# Patient Record
Sex: MALE | Race: OTHER | HISPANIC OR LATINO | URBAN - METROPOLITAN AREA
[De-identification: names, ages, dates, MRNs, and addresses within clinical notes are randomized per-mention and may not be internally consistent; named-entity substitution may affect disease eponyms.]

---

## 2017-03-13 ENCOUNTER — EMERGENCY (EMERGENCY)
Facility: HOSPITAL | Age: 27
LOS: 0 days | Discharge: HOME | End: 2017-03-13
Attending: EMERGENCY MEDICINE | Admitting: EMERGENCY MEDICINE
Payer: COMMERCIAL

## 2017-03-13 VITALS
HEART RATE: 78 BPM | OXYGEN SATURATION: 100 % | DIASTOLIC BLOOD PRESSURE: 81 MMHG | TEMPERATURE: 98 F | RESPIRATION RATE: 17 BRPM | SYSTOLIC BLOOD PRESSURE: 145 MMHG

## 2017-03-13 VITALS — HEIGHT: 69 IN | WEIGHT: 179.9 LBS

## 2017-03-13 DIAGNOSIS — L72.9 FOLLICULAR CYST OF THE SKIN AND SUBCUTANEOUS TISSUE, UNSPECIFIED: ICD-10-CM

## 2017-03-13 PROCEDURE — 99284 EMERGENCY DEPT VISIT MOD MDM: CPT

## 2017-03-13 RX ADMIN — Medication 100 MILLIGRAM(S): at 11:54

## 2017-03-13 NOTE — ED STATDOCS - OBJECTIVE STATEMENT
25 y/o male with no PMHx presents to the ED c/o right groin pain with associated swelling x few months. Swelling increases and decreases. Denies fever, abd pain, CP, SOB. Allergic to PCN. Admits to smoking and alcohol use. No PMD.

## 2017-03-13 NOTE — ED STATDOCS - PROGRESS NOTE DETAILS
patient seen and evaluated, nurse Angela chaperoned groin exam, there is a palpable 1x1 area of firmness in the right groin with mild erythema, consistent with inflammed cyst, patient states this is an on and off problem.  Will treat with abx and anti inflammatory medication, recommended follow up with a dermatologist or surgeon to have removed when inflammation decreases -Pamela Garcia PA-C

## 2017-03-13 NOTE — ED STATDOCS - NS ED MD SCRIBE ATTENDING SCRIBE SECTIONS
REVIEW OF SYSTEMS/PROGRESS NOTE/DISPOSITION/RESULTS/VITAL SIGNS( Pullset)/PHYSICAL EXAM/HISTORY OF PRESENT ILLNESS/PAST MEDICAL/SURGICAL/SOCIAL HISTORY

## 2017-03-13 NOTE — ED ADULT NURSE NOTE - CHPI ED SYMPTOMS NEG
no fever/no drainage/no bleeding/no bleeding at site/no chills/no purulent drainage/no redness/no vomiting/no red streaks

## 2017-03-13 NOTE — ED ADULT NURSE NOTE - OBJECTIVE STATEMENT
Pt presents to the ED with complaints of right groin pain/swelling. Pt states he has had a lump to the right groin for "months" that was painful this morning when he woke up. Pt states pain on palpation but has been able to bear weight and has no limitations due to the lump. Pt denies having any fevers or chills.

## 2017-09-15 ENCOUNTER — TRANSCRIPTION ENCOUNTER (OUTPATIENT)
Age: 27
End: 2017-09-15

## 2017-11-13 ENCOUNTER — EMERGENCY (EMERGENCY)
Facility: HOSPITAL | Age: 27
LOS: 0 days | Discharge: HOME | End: 2017-11-13
Attending: EMERGENCY MEDICINE | Admitting: EMERGENCY MEDICINE
Payer: OTHER MISCELLANEOUS

## 2017-11-13 VITALS
HEART RATE: 60 BPM | DIASTOLIC BLOOD PRESSURE: 70 MMHG | SYSTOLIC BLOOD PRESSURE: 130 MMHG | TEMPERATURE: 98 F | OXYGEN SATURATION: 100 %

## 2017-11-13 VITALS — WEIGHT: 195.11 LBS
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
## 2017-11-13 DIAGNOSIS — T22.00XA BURN OF UNSPECIFIED DEGREE OF SHOULDER AND UPPER LIMB, EXCEPT WRIST AND HAND, UNSPECIFIED SITE, INITIAL ENCOUNTER: ICD-10-CM

## 2017-11-13 DIAGNOSIS — T23.272A BURN OF SECOND DEGREE OF LEFT WRIST, INITIAL ENCOUNTER: ICD-10-CM
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
## 2017-11-13 DIAGNOSIS — T79.9XXA UNSPECIFIED EARLY COMPLICATION OF TRAUMA, INITIAL ENCOUNTER: ICD-10-CM

## 2017-11-13 PROCEDURE — 99283 EMERGENCY DEPT VISIT LOW MDM: CPT

## 2017-11-13 RX ORDER — TETANUS TOXOID, REDUCED DIPHTHERIA TOXOID AND ACELLULAR PERTUSSIS VACCINE, ADSORBED 5; 2.5; 8; 8; 2.5 [IU]/.5ML; [IU]/.5ML; UG/.5ML; UG/.5ML; UG/.5ML
0.5 SUSPENSION INTRAMUSCULAR ONCE
Qty: 0 | Refills: 0 | Status: COMPLETED | OUTPATIENT
Start: 2017-11-13 | End: 2017-11-13

## 2017-11-13 RX ORDER — IBUPROFEN 200 MG
600 TABLET ORAL ONCE
Qty: 0 | Refills: 0 | Status: COMPLETED | OUTPATIENT
Start: 2017-11-13 | End: 2017-11-13

## 2017-11-13 RX ADMIN — Medication 600 MILLIGRAM(S): at 16:18

## 2017-11-13 RX ADMIN — TETANUS TOXOID, REDUCED DIPHTHERIA TOXOID AND ACELLULAR PERTUSSIS VACCINE, ADSORBED 0.5 MILLILITER(S): 5; 2.5; 8; 8; 2.5 SUSPENSION INTRAMUSCULAR at 16:17

## 2017-11-13 NOTE — ED ADULT TRIAGE NOTE - CHIEF COMPLAINT QUOTE
pt states he was taking out hot food out of a [employer] press and bruned his right wrist with hot cheese.

## 2017-11-13 NOTE — ED PROVIDER NOTE - OBJECTIVE STATEMENT
28 Yo M with no pmhx presents for evaluation of burn to left wrist. 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 burn occurred at approximately 1:50PM today. 26 Yo M with no pmhx presents for evaluation of burn to left wrist. 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 burn occurred at approximately 1:50PM today. last tetanus immunization unknown.

## 2017-11-13 NOTE — ED PROVIDER NOTE - MEDICAL DECISION MAKING DETAILS
Burn of wrist, 2nd degree. Closed blisters Will apply bacitracin and wrap up area. -Facundo Joy PA-C

## 2017-11-13 NOTE — ED PROVIDER NOTE - PROGRESS NOTE DETAILS
28 yo male presents with right wrist burn at work when hot cheese spilled on wrist. Last tdap unknwon. 2nd degree burn to wrist.  -Facundo Joy PA-C

## 2017-11-13 NOTE — ED PROCEDURE NOTE - GENERAL PROCEDURE DETAILS
Applied bacitracin over the right volar wrist over the burn and covered with gauze and wrapped with kerlix. -Facundo Joy PA-C

## 2017-11-13 NOTE — ED ADULT NURSE NOTE - OBJECTIVE STATEMENT
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 right inner wrist, blistered as per pt. unable to see due to bandage applied by Rufino BLANCO

## 2017-11-13 NOTE — ED ADULT NURSE NOTE - CHIEF COMPLAINT QUOTE
pt states he was taking out hot food out of a [employer] press and bruned his right wrist with hot cheese.

## 2018-03-20 ENCOUNTER — EMERGENCY (EMERGENCY)
Facility: HOSPITAL | Age: 28
LOS: 0 days | Discharge: HOME | End: 2018-03-20
Attending: EMERGENCY MEDICINE | Admitting: EMERGENCY MEDICINE
Payer: COMMERCIAL

## 2018-03-20 VITALS — HEIGHT: 69 IN | WEIGHT: 190.04 LBS

## 2018-03-20 VITALS
HEART RATE: 84 BPM | RESPIRATION RATE: 16 BRPM | OXYGEN SATURATION: 100 % | SYSTOLIC BLOOD PRESSURE: 115 MMHG | TEMPERATURE: 98 F | DIASTOLIC BLOOD PRESSURE: 53 MMHG

## 2018-03-20 DIAGNOSIS — K92.1 MELENA: ICD-10-CM

## 2018-03-20 LAB
ABO RH CONFIRMATION: SIGNIFICANT CHANGE UP
ALBUMIN SERPL ELPH-MCNC: 4.7 G/DL — SIGNIFICANT CHANGE UP (ref 3.3–5)
ALP SERPL-CCNC: 50 U/L — SIGNIFICANT CHANGE UP (ref 40–120)
ALT FLD-CCNC: 26 U/L — SIGNIFICANT CHANGE UP (ref 12–78)
ANION GAP SERPL CALC-SCNC: 6 MMOL/L — SIGNIFICANT CHANGE UP (ref 5–17)
APTT BLD: 30.8 SEC — SIGNIFICANT CHANGE UP (ref 27.5–37.4)
AST SERPL-CCNC: 16 U/L — SIGNIFICANT CHANGE UP (ref 15–37)
BASOPHILS # BLD AUTO: 0.03 K/UL — SIGNIFICANT CHANGE UP (ref 0–0.2)
BASOPHILS NFR BLD AUTO: 0.4 % — SIGNIFICANT CHANGE UP (ref 0–2)
BILIRUB SERPL-MCNC: 0.3 MG/DL — SIGNIFICANT CHANGE UP (ref 0.2–1.2)
BLD GP AB SCN SERPL QL: SIGNIFICANT CHANGE UP
BUN SERPL-MCNC: 20 MG/DL — SIGNIFICANT CHANGE UP (ref 7–23)
CALCIUM SERPL-MCNC: 9.4 MG/DL — SIGNIFICANT CHANGE UP (ref 8.5–10.1)
CHLORIDE SERPL-SCNC: 104 MMOL/L — SIGNIFICANT CHANGE UP (ref 96–108)
CO2 SERPL-SCNC: 30 MMOL/L — SIGNIFICANT CHANGE UP (ref 22–31)
CREAT SERPL-MCNC: 1.18 MG/DL — SIGNIFICANT CHANGE UP (ref 0.5–1.3)
EOSINOPHIL # BLD AUTO: 0.11 K/UL — SIGNIFICANT CHANGE UP (ref 0–0.5)
EOSINOPHIL NFR BLD AUTO: 1.3 % — SIGNIFICANT CHANGE UP (ref 0–6)
GLUCOSE SERPL-MCNC: 88 MG/DL — SIGNIFICANT CHANGE UP (ref 70–99)
HCT VFR BLD CALC: 45.4 % — SIGNIFICANT CHANGE UP (ref 39–50)
HGB BLD-MCNC: 15 G/DL — SIGNIFICANT CHANGE UP (ref 13–17)
IMM GRANULOCYTES NFR BLD AUTO: 0.5 % — SIGNIFICANT CHANGE UP (ref 0–1.5)
INR BLD: 1.04 RATIO — SIGNIFICANT CHANGE UP (ref 0.88–1.16)
LIDOCAIN IGE QN: 161 U/L — SIGNIFICANT CHANGE UP (ref 73–393)
LYMPHOCYTES # BLD AUTO: 2.45 K/UL — SIGNIFICANT CHANGE UP (ref 1–3.3)
LYMPHOCYTES # BLD AUTO: 29.3 % — SIGNIFICANT CHANGE UP (ref 13–44)
MCHC RBC-ENTMCNC: 29.5 PG — SIGNIFICANT CHANGE UP (ref 27–34)
MCHC RBC-ENTMCNC: 33 GM/DL — SIGNIFICANT CHANGE UP (ref 32–36)
MCV RBC AUTO: 89.4 FL — SIGNIFICANT CHANGE UP (ref 80–100)
MONOCYTES # BLD AUTO: 0.48 K/UL — SIGNIFICANT CHANGE UP (ref 0–0.9)
MONOCYTES NFR BLD AUTO: 5.7 % — SIGNIFICANT CHANGE UP (ref 2–14)
NEUTROPHILS # BLD AUTO: 5.24 K/UL — SIGNIFICANT CHANGE UP (ref 1.8–7.4)
NEUTROPHILS NFR BLD AUTO: 62.8 % — SIGNIFICANT CHANGE UP (ref 43–77)
NRBC # BLD: 0 /100 WBCS — SIGNIFICANT CHANGE UP (ref 0–0)
PLATELET # BLD AUTO: 272 K/UL — SIGNIFICANT CHANGE UP (ref 150–400)
POTASSIUM SERPL-MCNC: 4.3 MMOL/L — SIGNIFICANT CHANGE UP (ref 3.5–5.3)
POTASSIUM SERPL-SCNC: 4.3 MMOL/L — SIGNIFICANT CHANGE UP (ref 3.5–5.3)
PROT SERPL-MCNC: 7.8 GM/DL — SIGNIFICANT CHANGE UP (ref 6–8.3)
PROTHROM AB SERPL-ACNC: 11.2 SEC — SIGNIFICANT CHANGE UP (ref 9.8–12.7)
RBC # BLD: 5.08 M/UL — SIGNIFICANT CHANGE UP (ref 4.2–5.8)
RBC # FLD: 13.2 % — SIGNIFICANT CHANGE UP (ref 10.3–14.5)
SODIUM SERPL-SCNC: 140 MMOL/L — SIGNIFICANT CHANGE UP (ref 135–145)
TYPE + AB SCN PNL BLD: SIGNIFICANT CHANGE UP
WBC # BLD: 8.35 K/UL — SIGNIFICANT CHANGE UP (ref 3.8–10.5)
WBC # FLD AUTO: 8.35 K/UL — SIGNIFICANT CHANGE UP (ref 3.8–10.5)

## 2018-03-20 PROCEDURE — 74177 CT ABD & PELVIS W/CONTRAST: CPT | Mod: 26

## 2018-03-20 PROCEDURE — 99284 EMERGENCY DEPT VISIT MOD MDM: CPT

## 2018-03-20 RX ORDER — SODIUM CHLORIDE 9 MG/ML
3 INJECTION INTRAMUSCULAR; INTRAVENOUS; SUBCUTANEOUS ONCE
Qty: 0 | Refills: 0 | Status: COMPLETED | OUTPATIENT
Start: 2018-03-20 | End: 2018-03-20

## 2018-03-20 RX ADMIN — SODIUM CHLORIDE 3 MILLILITER(S): 9 INJECTION INTRAMUSCULAR; INTRAVENOUS; SUBCUTANEOUS at 16:36

## 2018-03-20 NOTE — ED STATDOCS - PROGRESS NOTE DETAILS
FRANCIS Roque:   Patient has been seen, evaluated and orders have been written by the attending in intake. Patient is stable.  I will follow up the results of orders written and I will continue to evaluate/observe the patient. WBC 8,350, Hgb 15, Hct 45.4, Platelets 272  Na 140, K+ 4.3, glucose 88, BUN 20, Cr 1.18  Lipase 161.  CT pending.  Ext Rectal: No fissures.  No external hemorrhoids. Examined with Dr. Trujillo.  Cassidy Roque PA-C WBC 8,350, Hgb 15, Hct 45.4, Platelets 272  Na 140, K+ 4.3, glucose 88, BUN 20, Cr 1.18  Lipase 161.  CT pending.  Ext Rectal: No fissures.  No external hemorrhoids.  Examined with Dr. Trujillo.  Cassidy Roque PA-C CT scan:  Unremarkable CT of the abdomen and pelvis.  No evidence of Colitis. No abnormal Hgb/Hct.  Will refer pt. to GI.  Cassidy Roque PA-C

## 2018-03-20 NOTE — ED ADULT NURSE NOTE - OBJECTIVE STATEMENT
28 y/o M presents to the ED c/o BIBPR that started approximately over a month ago intermittently. Pt states that between yesterday and today, increased in bright red blood seen in stool and when wiping. Pt denies abdominal pain or pain upon having a bm. Pt denies nausea, vomiting.

## 2018-03-20 NOTE — ED STATDOCS - NS_ ATTENDINGSCRIBEDETAILS _ED_A_ED_FT
I, Dinh Crain MD,  performed the initial face to face bedside interview with this patient regarding history of present illness, review of symptoms and relevant past medical, social and family history.  I completed an independent physical examination.    The history, relevant review of systems, past medical and surgical history, medical decision making, and physical examination was documented by the scribe in my presence and I attest to the accuracy of the documentation.

## 2018-03-20 NOTE — ED STATDOCS - ATTENDING CONTRIBUTION TO CARE
I, Dinh Crain MD,  performed the initial face to face bedside interview with this patient regarding history of present illness, review of symptoms and relevant past medical, social and family history.  I completed an independent physical examination.  I was the initial provider who evaluated this patient. I have signed out the follow up of any pending tests (i.e. labs, radiological studies) to the ACP.  I have communicated the patient’s plan of care and disposition with the ACP

## 2018-03-20 NOTE — ED STATDOCS - OBJECTIVE STATEMENT
28 y/o M   c/o intermittent streaks of blood, intermittent mixed with stools for one months and now progressively worsening, today is complete red. Denies pain. PT states he has had hemorrhoids before with discomfort but at this time no discomfort.  Denies cramping, abd pain, vomiting, fever, no hx of surgery to abd. Rj abnormal diet. PMD: No current. No current medication. Pt states he had two bowel movements with red "globs" of stool. No foreign travel. Denies hx of colitis. No bleeding to site.

## 2018-03-20 NOTE — ED STATDOCS - GASTROINTESTINAL, MLM
no visualized Hemorid, no rectal fissures. abdomen soft, non-tender, and non-distended. Bowel sounds present.

## 2018-06-16 ENCOUNTER — TRANSCRIPTION ENCOUNTER (OUTPATIENT)
Age: 28
End: 2018-06-16

## 2018-06-17 ENCOUNTER — EMERGENCY (EMERGENCY)
Facility: HOSPITAL | Age: 28
LOS: 1 days | Discharge: HOME | End: 2018-06-17
Attending: EMERGENCY MEDICINE
Payer: COMMERCIAL

## 2018-06-17 ENCOUNTER — TRANSCRIPTION ENCOUNTER (OUTPATIENT)
Age: 28
End: 2018-06-17

## 2018-06-17 VITALS
DIASTOLIC BLOOD PRESSURE: 70 MMHG | SYSTOLIC BLOOD PRESSURE: 120 MMHG | TEMPERATURE: 98 F | HEART RATE: 74 BPM | OXYGEN SATURATION: 99 % | RESPIRATION RATE: 19 BRPM

## 2018-06-17 VITALS — WEIGHT: 190.04 LBS | HEIGHT: 69 IN

## 2018-06-17 LAB
ALBUMIN SERPL ELPH-MCNC: 4.5 G/DL — SIGNIFICANT CHANGE UP (ref 3.3–5.2)
ALP SERPL-CCNC: 51 U/L — SIGNIFICANT CHANGE UP (ref 40–120)
ALT FLD-CCNC: 25 U/L — SIGNIFICANT CHANGE UP
ANION GAP SERPL CALC-SCNC: 12 MMOL/L — SIGNIFICANT CHANGE UP (ref 5–17)
AST SERPL-CCNC: 21 U/L — SIGNIFICANT CHANGE UP
BASOPHILS # BLD AUTO: 0 K/UL — SIGNIFICANT CHANGE UP (ref 0–0.2)
BASOPHILS NFR BLD AUTO: 0.1 % — SIGNIFICANT CHANGE UP (ref 0–2)
BILIRUB SERPL-MCNC: 0.5 MG/DL — SIGNIFICANT CHANGE UP (ref 0.4–2)
BUN SERPL-MCNC: 16 MG/DL — SIGNIFICANT CHANGE UP (ref 8–20)
CALCIUM SERPL-MCNC: 9.3 MG/DL — SIGNIFICANT CHANGE UP (ref 8.6–10.2)
CHLORIDE SERPL-SCNC: 101 MMOL/L — SIGNIFICANT CHANGE UP (ref 98–107)
CO2 SERPL-SCNC: 26 MMOL/L — SIGNIFICANT CHANGE UP (ref 22–29)
CREAT SERPL-MCNC: 0.93 MG/DL — SIGNIFICANT CHANGE UP (ref 0.5–1.3)
EOSINOPHIL # BLD AUTO: 0.1 K/UL — SIGNIFICANT CHANGE UP (ref 0–0.5)
EOSINOPHIL NFR BLD AUTO: 0.9 % — SIGNIFICANT CHANGE UP (ref 0–5)
GLUCOSE SERPL-MCNC: 104 MG/DL — SIGNIFICANT CHANGE UP (ref 70–115)
HCT VFR BLD CALC: 44.4 % — SIGNIFICANT CHANGE UP (ref 42–52)
HGB BLD-MCNC: 14.7 G/DL — SIGNIFICANT CHANGE UP (ref 14–18)
LACTATE BLDV-MCNC: 1.2 MMOL/L — SIGNIFICANT CHANGE UP (ref 0.5–2)
LYMPHOCYTES # BLD AUTO: 1.2 K/UL — SIGNIFICANT CHANGE UP (ref 1–4.8)
LYMPHOCYTES # BLD AUTO: 14.8 % — LOW (ref 20–55)
MCHC RBC-ENTMCNC: 29.6 PG — SIGNIFICANT CHANGE UP (ref 27–31)
MCHC RBC-ENTMCNC: 33.1 G/DL — SIGNIFICANT CHANGE UP (ref 32–36)
MCV RBC AUTO: 89.3 FL — SIGNIFICANT CHANGE UP (ref 80–94)
MONOCYTES # BLD AUTO: 0.6 K/UL — SIGNIFICANT CHANGE UP (ref 0–0.8)
MONOCYTES NFR BLD AUTO: 7.8 % — SIGNIFICANT CHANGE UP (ref 3–10)
NEUTROPHILS # BLD AUTO: 6.1 K/UL — SIGNIFICANT CHANGE UP (ref 1.8–8)
NEUTROPHILS NFR BLD AUTO: 76 % — HIGH (ref 37–73)
PLATELET # BLD AUTO: 241 K/UL — SIGNIFICANT CHANGE UP (ref 150–400)
POTASSIUM SERPL-MCNC: 4 MMOL/L — SIGNIFICANT CHANGE UP (ref 3.5–5.3)
POTASSIUM SERPL-SCNC: 4 MMOL/L — SIGNIFICANT CHANGE UP (ref 3.5–5.3)
PROT SERPL-MCNC: 6.9 G/DL — SIGNIFICANT CHANGE UP (ref 6.6–8.7)
RBC # BLD: 4.97 M/UL — SIGNIFICANT CHANGE UP (ref 4.6–6.2)
RBC # FLD: 13.4 % — SIGNIFICANT CHANGE UP (ref 11–15.6)
SODIUM SERPL-SCNC: 139 MMOL/L — SIGNIFICANT CHANGE UP (ref 135–145)
WBC # BLD: 8 K/UL — SIGNIFICANT CHANGE UP (ref 4.8–10.8)
WBC # FLD AUTO: 8 K/UL — SIGNIFICANT CHANGE UP (ref 4.8–10.8)

## 2018-06-17 PROCEDURE — 87040 BLOOD CULTURE FOR BACTERIA: CPT

## 2018-06-17 PROCEDURE — 87070 CULTURE OTHR SPECIMN AEROBIC: CPT

## 2018-06-17 PROCEDURE — 99284 EMERGENCY DEPT VISIT MOD MDM: CPT | Mod: 25

## 2018-06-17 PROCEDURE — 80053 COMPREHEN METABOLIC PANEL: CPT

## 2018-06-17 PROCEDURE — 36415 COLL VENOUS BLD VENIPUNCTURE: CPT

## 2018-06-17 PROCEDURE — 73110 X-RAY EXAM OF WRIST: CPT | Mod: 26,LT

## 2018-06-17 PROCEDURE — 83605 ASSAY OF LACTIC ACID: CPT

## 2018-06-17 PROCEDURE — 96374 THER/PROPH/DIAG INJ IV PUSH: CPT

## 2018-06-17 PROCEDURE — 99284 EMERGENCY DEPT VISIT MOD MDM: CPT

## 2018-06-17 PROCEDURE — 85027 COMPLETE CBC AUTOMATED: CPT

## 2018-06-17 PROCEDURE — 73110 X-RAY EXAM OF WRIST: CPT

## 2018-06-17 RX ORDER — IBUPROFEN 200 MG
1 TABLET ORAL
Qty: 15 | Refills: 0 | OUTPATIENT
Start: 2018-06-17 | End: 2018-06-21

## 2018-06-17 RX ADMIN — Medication 100 MILLIGRAM(S): at 15:52

## 2018-06-17 NOTE — ED STATDOCS - OBJECTIVE STATEMENT
27 yr old M presented to ED with left wrist cat bite. Pt explained that he was  his dog and his brother cat from fighting when the cat bit him on his left wrist. Pt says that incident occurred yesterday and he noticed swelling and redness to his wrist. Pt admits to pain with wrist flexion and extension. Pt denies any numbness or weakness to his wrist. Pt denies nay significant past medical or surgical illness.

## 2018-06-17 NOTE — ED STATDOCS - PHYSICAL EXAMINATION
Animal bite: Left wrist +cat bite and multiple puncture wounds noted. Bite to both 4th and 5th digit . No injury to finger nail.   Normal ROM present. Normal sensation .

## 2018-06-17 NOTE — ED ADULT NURSE NOTE - OBJECTIVE STATEMENT
pt c/o bite to left arm. pt was bitten by brother's exotic b[place] 3rd generation domestic cat. states he was breaking up fight between cat and his dog. pt state his last tetnus was 2 years ago. denies medical history. left arm swollen and reddened bleeding controlled

## 2018-06-17 NOTE — ED STATDOCS - CARE PLAN
Principal Discharge DX:	Cat bite, initial encounter  Assessment and plan of treatment:	Continue with Medication and F/U as discussed

## 2018-06-17 NOTE — ED STATDOCS - PROGRESS NOTE DETAILS
Pt seen by Dr. Mae. and wound was opened. Pt will Continue wit Augmentin twice daily x 10 days. Pt will also F/U with Dr. Mae on Tuesday for Packing removal.

## 2018-06-17 NOTE — ED STATDOCS - ATTENDING CONTRIBUTION TO CARE
After interviewing the patient, I agree with the HPI as discussed . My personal exam reveals findings consistent with those discussed. Available diagnostic studies were reviewed. I confirm the diagnosis as discussed with the ACP. The care plan articulated is consistent with our discussion of the patient's particulars. In brief, Hx [cat bite ],Exam [Redness and swelling And the multiple puncture wounds to  the Left wrist and the fourth And fifth digits ], Plan[ Patient was seen by Plastic  surgery And Was opened And Drained And patient will continue Augmentin and Follow up in the office]

## 2018-06-19 LAB
CULTURE RESULTS: SIGNIFICANT CHANGE UP
SPECIMEN SOURCE: SIGNIFICANT CHANGE UP

## 2018-06-22 LAB
CULTURE RESULTS: SIGNIFICANT CHANGE UP
CULTURE RESULTS: SIGNIFICANT CHANGE UP
SPECIMEN SOURCE: SIGNIFICANT CHANGE UP
SPECIMEN SOURCE: SIGNIFICANT CHANGE UP

## 2018-08-11 ENCOUNTER — TRANSCRIPTION ENCOUNTER (OUTPATIENT)
Age: 28
End: 2018-08-11

## 2018-08-28 ENCOUNTER — TRANSCRIPTION ENCOUNTER (OUTPATIENT)
Age: 28
End: 2018-08-28

## 2019-11-22 ENCOUNTER — TRANSCRIPTION ENCOUNTER (OUTPATIENT)
Age: 29
End: 2019-11-22

## 2019-12-04 ENCOUNTER — APPOINTMENT (OUTPATIENT)
Dept: ORTHOPEDIC SURGERY | Facility: CLINIC | Age: 29
End: 2019-12-04
Payer: COMMERCIAL

## 2019-12-04 VITALS
BODY MASS INDEX: 28.88 KG/M2 | DIASTOLIC BLOOD PRESSURE: 68 MMHG | TEMPERATURE: 98.4 F | SYSTOLIC BLOOD PRESSURE: 116 MMHG | HEIGHT: 69 IN | WEIGHT: 195 LBS | HEART RATE: 68 BPM

## 2019-12-04 DIAGNOSIS — Z72.89 OTHER PROBLEMS RELATED TO LIFESTYLE: ICD-10-CM

## 2019-12-04 DIAGNOSIS — M65.4 RADIAL STYLOID TENOSYNOVITIS [DE QUERVAIN]: ICD-10-CM

## 2019-12-04 DIAGNOSIS — Z78.9 OTHER SPECIFIED HEALTH STATUS: ICD-10-CM

## 2019-12-04 PROCEDURE — 73100 X-RAY EXAM OF WRIST: CPT | Mod: RT

## 2019-12-04 PROCEDURE — 99213 OFFICE O/P EST LOW 20 MIN: CPT

## 2019-12-09 PROBLEM — M65.4 TENOSYNOVITIS, DE QUERVAIN: Status: ACTIVE | Noted: 2019-12-09

## 2019-12-09 NOTE — HISTORY OF PRESENT ILLNESS
[6] : the ailment interference is 6/10 [7] : the ailment interference is 7/10 [(Does not interfere) 0] : the ailment interference is 0/10 (does not interfere) [de-identified] : The patient comes in today for his right wrist.  He had been seen in the past for tendinitis and he was doing well.  Most recently, he overdid it on Thanksgiving and developed pain some pain on the radial border of his wrist.  The patient states the onset/injury occurred on 11/29/2019.  This injury is not work related or due to an automobile accident.  [] : No

## 2019-12-09 NOTE — PHYSICAL EXAM
[Normal] : Gait: normal [de-identified] : Left Wrist: Range of Motion in Degrees:\par 	                                                Claimant:	                Normal:	\par Dorsiflexion: (Active)               	90-degrees	90-degrees	\par Dorsiflexion: (Passive)	                90-degrees	90-degrees	\par Palmar flexion: (Active)              	90-degrees	90-degrees	\par Palmar flexion: (Passive)              	90-degrees	90-degrees	\par Radial & ulnar deviation(Active)	30-degrees	30-degrees	\par Radial & ulnar deviation(Passive)	30-degrees	30-degrees	\par Pronation/Supination:(Active)    	0-180 degrees	0-180 degrees	\par Pronation/Supination:(Passive)          	0-180 degrees	0-180 degrees	\par \par No instability.  No volar, radial or ulnar tenderness.  No dorsal, radial or ulnar tenderness.  Negative Tinel’s.  Negative Phalen’s.   No tenderness at the TFCC.  No tenderness with ulnar deviation.  No tenderness of the first dorsal compartment.  Negative Finkelstein’s.  No tenderness at the level of the basal joint.  Negative grind.  No muscular atrophy.  No tenderness with flexion and extension of the wrist.  No motor or sensory deficits.  2+ radial and ulnar pulses.  Skin is intact.  No rashes, scars or lesions.  \par \par Right Wrist: Range of Motion in Degrees:\par 	                                                Claimant:	                Normal:	\par Dorsiflexion: (Active)               	90-degrees	90-degrees	\par Dorsiflexion: (Passive)	                90-degrees	90-degrees	\par Palmar flexion: (Active)              	90-degrees	90-degrees	\par Palmar flexion: (Passive)              	90-degrees	90-degrees	\par Radial & ulnar deviation(Active)	30-degrees	30-degrees	\par Radial & ulnar deviation(Passive)	30-degrees	30-degrees	\par Pronation/Supination:(Active)    	0-180 degrees	0-180 degrees	\par Pronation/Supination:(Passive)          	0-180 degrees	0-180 degrees	\par \par No instability.  Tenderness over the first dorsal compartment.  No volar, radial or ulnar tenderness.  No dorsal, radial or ulnar tenderness.  Negative Tinel’s.  Negative Phalen’s.   No tenderness at the TFCC.  No tenderness with ulnar deviation  No tenderness of the first dorsal compartment.  Positive Finkelstein’s test.  No tenderness at the level of the basal joint.  Negative grind.  No muscular atrophy.  No motor or sensory deficits.  2+ radial and ulnar pulses.  Skin is intact.  No rashes, scars or lesions. \par   [de-identified] : Appearance:  Well developed, well-nourished male in no acute distress.\par   [de-identified] : Radiographs, two views of the right wrist, show no significant osseous abnormalities.\par

## 2019-12-09 NOTE — ADDENDUM
[FreeTextEntry1] : This note was written by Jordin Arzola on 12/09/2019, acting as a scribe for Carl Richardson III, MD

## 2019-12-09 NOTE — DISCUSSION/SUMMARY
[de-identified] : At this time, due to de Quervain tenosynovitis of the right wrist, I recommended a wrist splint, ice, anti-inflammatory and reassessment in 3-4 weeks.\par

## 2020-02-05 ENCOUNTER — APPOINTMENT (OUTPATIENT)
Dept: UROLOGY | Facility: CLINIC | Age: 30
End: 2020-02-05

## 2020-03-14 ENCOUNTER — TRANSCRIPTION ENCOUNTER (OUTPATIENT)
Age: 30
End: 2020-03-14

## 2020-04-25 ENCOUNTER — MESSAGE (OUTPATIENT)
Age: 30
End: 2020-04-25

## 2020-05-01 LAB
SARS-COV-2 IGG SERPL IA-ACNC: 0 INDEX
SARS-COV-2 IGG SERPL QL IA: NEGATIVE

## 2020-07-29 ENCOUNTER — APPOINTMENT (OUTPATIENT)
Dept: UROLOGY | Facility: CLINIC | Age: 30
End: 2020-07-29

## 2020-08-12 ENCOUNTER — APPOINTMENT (OUTPATIENT)
Dept: UROLOGY | Facility: CLINIC | Age: 30
End: 2020-08-12
Payer: COMMERCIAL

## 2020-08-12 ENCOUNTER — APPOINTMENT (OUTPATIENT)
Dept: UROLOGY | Facility: CLINIC | Age: 30
End: 2020-08-12

## 2020-08-12 VITALS
OXYGEN SATURATION: 99 % | HEIGHT: 69 IN | TEMPERATURE: 98 F | SYSTOLIC BLOOD PRESSURE: 127 MMHG | HEART RATE: 61 BPM | BODY MASS INDEX: 28.88 KG/M2 | WEIGHT: 195 LBS | DIASTOLIC BLOOD PRESSURE: 76 MMHG

## 2020-08-12 PROCEDURE — 99203 OFFICE O/P NEW LOW 30 MIN: CPT

## 2020-08-12 NOTE — HISTORY OF PRESENT ILLNESS
[FreeTextEntry1] : 28 yo male presents for scrotal mass. \par Has noticed lump on left side of scrotum for last 1 year and grown in size. \par No pain or discomfort but causes itching in hot weather. \par No history of trauma or STD. \par Denies any difficulty with urination. \par Denies dysuria, hematuria, lower abdominal or flank pain, fever, chills or rigors.

## 2020-08-12 NOTE — ASSESSMENT
[FreeTextEntry1] : Scrotal sebaceous cyst:\par Discussed benign lesion. Discussed observation, trial of Antibiotics and surgical excision. \par Will do Bactrim DS x 5 days. \par If still persistent will get Scrotal Ultrasound and schedule excision under local anesthesia.

## 2020-08-12 NOTE — PHYSICAL EXAM
[General Appearance - Well Developed] : well developed [Normal Appearance] : normal appearance [] : no respiratory distress [General Appearance - In No Acute Distress] : no acute distress [Abdomen Tenderness] : non-tender [Abdomen Mass (___ Cm)] : no abdominal mass palpated [Abdomen Soft] : soft [Costovertebral Angle Tenderness] : no ~M costovertebral angle tenderness [Penis Abnormality] : normal circumcised penis [Urethral Meatus] : meatus normal [Testes Tenderness] : no tenderness of the testes [Epididymis] : the epididymides were normal [Testes Mass (___cm)] : there were no testicular masses [Normal Station and Gait] : the gait and station were normal for the patient's age [Skin Color & Pigmentation] : normal skin color and pigmentation [FreeTextEntry1] : left 1cm mid scrotal sebaceous cyst  [No Focal Deficits] : no focal deficits [Oriented To Time, Place, And Person] : oriented to person, place, and time [No Palpable Adenopathy] : no palpable adenopathy

## 2020-08-19 ENCOUNTER — APPOINTMENT (OUTPATIENT)
Dept: UROLOGY | Facility: CLINIC | Age: 30
End: 2020-08-19
Payer: COMMERCIAL

## 2020-08-19 VITALS — TEMPERATURE: 98.4 F

## 2020-08-19 PROCEDURE — 99213 OFFICE O/P EST LOW 20 MIN: CPT

## 2020-08-26 ENCOUNTER — OUTPATIENT (OUTPATIENT)
Dept: OUTPATIENT SERVICES | Facility: HOSPITAL | Age: 30
LOS: 1 days | End: 2020-08-26
Payer: COMMERCIAL

## 2020-08-26 DIAGNOSIS — L72.3 SEBACEOUS CYST: ICD-10-CM

## 2020-08-26 PROCEDURE — 76870 US EXAM SCROTUM: CPT | Mod: 26

## 2020-08-26 PROCEDURE — 76870 US EXAM SCROTUM: CPT

## 2020-08-27 DIAGNOSIS — L72.3 SEBACEOUS CYST: ICD-10-CM

## 2020-08-30 NOTE — HISTORY OF PRESENT ILLNESS
[FreeTextEntry1] : 30 yo male presents for follow up. \par Did not do Antibiotics, concerned about side effects. \par Will like to move forward with excision. \par \par \par Initially seen for scrotal mass. \par Had noticed lump on left side of scrotum for last 1 year and grown in size. \par No pain or discomfort but causes itching in hot weather. \par No history of trauma or STD. \par Denied any difficulty with urination. \par Denied dysuria, hematuria, lower abdominal or flank pain, fever, chills or rigors.

## 2020-08-30 NOTE — PHYSICAL EXAM
[Normal Appearance] : normal appearance [General Appearance - In No Acute Distress] : no acute distress [FreeTextEntry1] : normal peripheral circulation  [] : no respiratory distress [Oriented To Time, Place, And Person] : oriented to person, place, and time [Normal Station and Gait] : the gait and station were normal for the patient's age

## 2020-09-02 ENCOUNTER — APPOINTMENT (OUTPATIENT)
Dept: UROLOGY | Facility: CLINIC | Age: 30
End: 2020-09-02
Payer: COMMERCIAL

## 2020-09-02 VITALS
HEIGHT: 69 IN | SYSTOLIC BLOOD PRESSURE: 121 MMHG | TEMPERATURE: 97.7 F | HEART RATE: 77 BPM | BODY MASS INDEX: 28.88 KG/M2 | OXYGEN SATURATION: 100 % | DIASTOLIC BLOOD PRESSURE: 78 MMHG | WEIGHT: 195 LBS

## 2020-09-02 PROCEDURE — 11422 EXC H-F-NK-SP B9+MARG 1.1-2: CPT

## 2020-09-08 LAB — CORE LAB BIOPSY: NORMAL

## 2020-09-09 ENCOUNTER — EMERGENCY (EMERGENCY)
Facility: HOSPITAL | Age: 30
LOS: 0 days | Discharge: HOME | End: 2020-09-09
Attending: STUDENT IN AN ORGANIZED HEALTH CARE EDUCATION/TRAINING PROGRAM
Payer: COMMERCIAL

## 2020-09-09 VITALS
DIASTOLIC BLOOD PRESSURE: 87 MMHG | SYSTOLIC BLOOD PRESSURE: 151 MMHG | OXYGEN SATURATION: 98 % | TEMPERATURE: 99 F | HEART RATE: 90 BPM | RESPIRATION RATE: 18 BRPM

## 2020-09-09 VITALS
OXYGEN SATURATION: 99 % | SYSTOLIC BLOOD PRESSURE: 128 MMHG | RESPIRATION RATE: 15 BRPM | DIASTOLIC BLOOD PRESSURE: 69 MMHG | HEART RATE: 69 BPM | TEMPERATURE: 98 F

## 2020-09-09 DIAGNOSIS — L72.9 FOLLICULAR CYST OF THE SKIN AND SUBCUTANEOUS TISSUE, UNSPECIFIED: ICD-10-CM

## 2020-09-09 DIAGNOSIS — Z79.2 LONG TERM (CURRENT) USE OF ANTIBIOTICS: ICD-10-CM

## 2020-09-09 DIAGNOSIS — Z79.1 LONG TERM (CURRENT) USE OF NON-STEROIDAL ANTI-INFLAMMATORIES (NSAID): ICD-10-CM

## 2020-09-09 DIAGNOSIS — Z98.890 OTHER SPECIFIED POSTPROCEDURAL STATES: ICD-10-CM

## 2020-09-09 DIAGNOSIS — N99.820 POSTPROCEDURAL HEMORRHAGE OF A GENITOURINARY SYSTEM ORGAN OR STRUCTURE FOLLOWING A GENITOURINARY SYSTEM PROCEDURE: ICD-10-CM

## 2020-09-09 DIAGNOSIS — T81.41XA INFECTION FOLLOWING A PROCEDURE, SUPERFICIAL INCISIONAL SURGICAL SITE, INITIAL ENCOUNTER: ICD-10-CM
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
## 2020-09-09 DIAGNOSIS — Z88.0 ALLERGY STATUS TO PENICILLIN: ICD-10-CM
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
## 2020-09-09 PROCEDURE — 99284 EMERGENCY DEPT VISIT MOD MDM: CPT

## 2020-09-09 PROCEDURE — 99284 EMERGENCY DEPT VISIT MOD MDM: CPT | Mod: 25

## 2020-09-09 RX ORDER — AZTREONAM 2 G
1 VIAL (EA) INJECTION
Qty: 6 | Refills: 0
Start: 2020-09-09 | End: 2020-09-11

## 2020-09-09 NOTE — CONSULT NOTE ADULT - ASSESSMENT
28 yo male with hx of left scrotal cyst s/p excision of the cyst on 9/2/20. Patient now presents to ER after one of his dissolvable sutures disappeared and has a less than 0.5 cm open scrotal incision site with no evidence of infection. Discussed with Dr. Lim, given the incision was superficial and it has been a week there is no indication for suturing the would at this time. Recommend pt to continue his ABX (Bactrim) and will prescribe 3 more days for better coverage. Patient can follow up with Dr. Lim in the office next week for wound check as scheduled. Patient can call the office is any new symptoms arise.     Case discussed with Dr. Lim.

## 2020-09-09 NOTE — ED ADULT NURSE NOTE - OBJECTIVE STATEMENT
pt preseents to ED from home, pt alert and orietnedx4, VSs  afbrule, pt states one week ago he had a procedure to his scrotum and today his sutures opened causing bleeding to his incision site. pt denies pain fever fall trauma safety maintained

## 2020-09-09 NOTE — ED ADULT TRIAGE NOTE - CHIEF COMPLAINT QUOTE
pt presents to ed ambulatory for evaluation of stitches on scrotum. pt states " the stitches on my scrotum opened". pt reports scrotum cyst removal last weds (9/2). pt denies fever/chills. denies trauma. denies pain.

## 2020-09-09 NOTE — CONSULT NOTE ADULT - SUBJECTIVE AND OBJECTIVE BOX
HPI:    30 yo male with hx of left scrotal cyst s/p excision of the cyst on 9/2/20. Patient now presents to ER after one of his dissolvable sutures disappeared and has a less than 0.5 cm open scrotal incision site. Patient reports he noticed it this morning and denies any fevers, chills, nausea, vomiting, scrotal pain, purulent discharge, active bleed, erythema or swelling. He is currently on Bactrim      PAST MEDICAL & SURGICAL HISTORY:  No pertinent past medical history  No significant past surgical history      REVIEW OF SYSTEMS      Constitutional: No fever, no chills         Skin: No rash, No itching      Eyes: No pain , No diplopia  ENT: no hearing loss, No epistaxis      CVS: no CP  / Resp:no wheezing, no SOB  GI: no nausea, no vomitting, no diarrhea, no abdominal pain  : no Hematuria , no Dysuria, no frequency, no urgency, no incontinence, no hesitancy, no nocturia, +scrotal wound  Neuro: no confusion /  Musculoskeletal: no muscle pain        SOCIAL HISTORY:    FAMILY HISTORY:  No pertinent family history in first degree relatives      Vital Signs Last 24 Hrs  T(C): 37.1 (09 Sep 2020 08:14), Max: 37.1 (09 Sep 2020 08:14)  T(F): 98.7 (09 Sep 2020 08:14), Max: 98.7 (09 Sep 2020 08:14)  HR: 90 (09 Sep 2020 08:14) (90 - 90)  BP: 151/87 (09 Sep 2020 08:14) (151/87 - 151/87)  BP(mean): 97 (09 Sep 2020 08:14) (97 - 97)  RR: 18 (09 Sep 2020 08:14) (18 - 18)  SpO2: 98% (09 Sep 2020 08:14) (98% - 98%)    PHYSICAL EXAM:    General: No distress, No anxiety  VITALS  T(C): 37.1 (09-09-20 @ 08:14), Max: 37.1 (09-09-20 @ 08:14)  HR: 90 (09-09-20 @ 08:14) (90 - 90)  BP: 151/87 (09-09-20 @ 08:14) (151/87 - 151/87)  RR: 18 (09-09-20 @ 08:14) (18 - 18)  SpO2: 98% (09-09-20 @ 08:14) (98% - 98%)            Skin     : No jaundice, No lesions, No swelling  HEENT: No icterus , EOM full , No epistaxis  Abdo:   : Soft, Non tender, No guarding, No distension   Back    : No CVAT  Extremity: No calf tenderness, No edema  Genitalia Male: No Ramirez. less than 0.5 cm open scrotal wound at the incision site, clean/dry with no evidence of active bleed or discharge, non tender. No scrotal swelling, No testicular swelling. No erythema, No crepitation, No induration   Neuro   : A&Ox3

## 2020-09-09 NOTE — ED PROVIDER NOTE - PATIENT PORTAL LINK FT
You can access the FollowMyHealth Patient Portal offered by [facility] by registering at the following website: [email or web address]. By joining [facility] FollowMyHealth portal, you will also be able to view your health information using other applications (apps) compatible with our system.

## 2020-09-09 NOTE — ED PROVIDER NOTE - SKIN, MLM
Skin normal color for race, warm, dry. No evidence of rash. .5 cm open wound. No active drainage. No surrounding erythema or warmth.

## 2020-09-09 NOTE — ED PROVIDER NOTE - OBJECTIVE STATEMENT
28 y/o male with a PMHx of cyst to left side of scrotum presents to the ED for evaluation. Pt had cyst removed from his scrotum one week ago. Pt was walking into work this morning, felt fluid got down his leg and saw blood at surgical site. Pt has follow up with Dr. Lim next week. No other complaints at this time.

## 2020-09-09 NOTE — ED PROVIDER NOTE - PROGRESS NOTE DETAILS
Bijal BARBER: patient seen and evaluated by urology team- Dr. Lim- wound to be kept open; will continue antibiotics- urology to order rx; f/u with Dr. Lim next week as instructed.

## 2020-09-18 ENCOUNTER — APPOINTMENT (OUTPATIENT)
Dept: UROLOGY | Facility: CLINIC | Age: 30
End: 2020-09-18
Payer: COMMERCIAL

## 2020-09-18 VITALS — TEMPERATURE: 98.2 F

## 2020-09-18 PROCEDURE — 99024 POSTOP FOLLOW-UP VISIT: CPT

## 2020-09-27 NOTE — ASSESSMENT
[FreeTextEntry1] : Scrotal sebaceous cyst:\par Discussed Pathology. \par Discussed option of observation Vs Revision surgery. \par Will like to observe.\par \par Return to office in 2 weeks or sooner if any issues.

## 2020-09-27 NOTE — HISTORY OF PRESENT ILLNESS
[FreeTextEntry1] : 28 yo male presents for follow up. \par Went to [facility] ED with wound separation and bleeding. \par Since then bleeding stop, still has wound. \par \par Initially seen for scrotal mass. \par Had noticed lump on left side of scrotum for last 1 year and grown in size. \par No pain or discomfort but causes itching in hot weather. \par No history of trauma or STD. \par Denied any difficulty with urination. \par Denied dysuria, hematuria, lower abdominal or flank pain, fever, chills or rigors.

## 2020-09-27 NOTE — PHYSICAL EXAM
[Normal Appearance] : normal appearance [General Appearance - In No Acute Distress] : no acute distress [FreeTextEntry1] : normal peripheral circulation  [] : no respiratory distress [Oriented To Time, Place, And Person] : oriented to person, place, and time

## 2020-12-07 ENCOUNTER — OUTPATIENT (OUTPATIENT)
Dept: OUTPATIENT SERVICES | Facility: HOSPITAL | Age: 30
LOS: 1 days | End: 2020-12-07
Payer: COMMERCIAL

## 2020-12-07 DIAGNOSIS — Z11.59 ENCOUNTER FOR SCREENING FOR OTHER VIRAL DISEASES: ICD-10-CM

## 2020-12-07 LAB — SARS-COV-2 RNA SPEC QL NAA+PROBE: SIGNIFICANT CHANGE UP

## 2020-12-07 PROCEDURE — 87635 SARS-COV-2 COVID-19 AMP PRB: CPT

## 2020-12-08 DIAGNOSIS — Z11.59 ENCOUNTER FOR SCREENING FOR OTHER VIRAL DISEASES: ICD-10-CM

## 2020-12-30 ENCOUNTER — OUTPATIENT (OUTPATIENT)
Dept: OUTPATIENT SERVICES | Facility: HOSPITAL | Age: 30
LOS: 1 days | End: 2020-12-30
Payer: COMMERCIAL

## 2020-12-30 DIAGNOSIS — Z20.828 CONTACT WITH AND (SUSPECTED) EXPOSURE TO OTHER VIRAL COMMUNICABLE DISEASES: ICD-10-CM

## 2020-12-30 LAB — SARS-COV-2 RNA SPEC QL NAA+PROBE: SIGNIFICANT CHANGE UP

## 2020-12-30 PROCEDURE — C9803: CPT

## 2020-12-30 PROCEDURE — 87635 SARS-COV-2 COVID-19 AMP PRB: CPT

## 2020-12-31 DIAGNOSIS — Z20.828 CONTACT WITH AND (SUSPECTED) EXPOSURE TO OTHER VIRAL COMMUNICABLE DISEASES: ICD-10-CM

## 2021-12-21 ENCOUNTER — OUTPATIENT (OUTPATIENT)
Dept: OUTPATIENT SERVICES | Facility: HOSPITAL | Age: 31
LOS: 1 days | End: 2021-12-21
Payer: COMMERCIAL

## 2021-12-21 DIAGNOSIS — Z20.828 CONTACT WITH AND (SUSPECTED) EXPOSURE TO OTHER VIRAL COMMUNICABLE DISEASES: ICD-10-CM

## 2021-12-21 LAB — SARS-COV-2 RNA SPEC QL NAA+PROBE: DETECTED

## 2021-12-21 PROCEDURE — 87635 SARS-COV-2 COVID-19 AMP PRB: CPT

## 2021-12-22 DIAGNOSIS — Z20.828 CONTACT WITH AND (SUSPECTED) EXPOSURE TO OTHER VIRAL COMMUNICABLE DISEASES: ICD-10-CM

## 2022-02-17 NOTE — ED PROVIDER NOTE - NS ED ATTENDING STATEMENT MOD
I have personally performed a face to face diagnostic evaluation on this patient. I have reviewed the ACP note and agree with the history, exam and plan of care, except as noted. Parents and 2 sisters

## 2022-04-26 ENCOUNTER — TRANSCRIPTION ENCOUNTER (OUTPATIENT)
Age: 32
End: 2022-04-26

## 2022-05-18 ENCOUNTER — APPOINTMENT (OUTPATIENT)
Dept: ORTHOPEDIC SURGERY | Facility: CLINIC | Age: 32
End: 2022-05-18
Payer: COMMERCIAL

## 2022-05-18 PROCEDURE — 99213 OFFICE O/P EST LOW 20 MIN: CPT

## 2022-05-18 PROCEDURE — 73030 X-RAY EXAM OF SHOULDER: CPT | Mod: RT

## 2022-05-19 NOTE — ADDENDUM
[FreeTextEntry1] : This note was written by Eva Zuñiga on 05/19/2022 acting as scribe for Carl Richardson III, MD

## 2022-05-19 NOTE — DISCUSSION/SUMMARY
[de-identified] : The patient presents with AC arthritis, right shoulder.  At this time I recommend rest, ice, topical anti-inflammatory and be reassessed in three or four weeks.

## 2022-05-19 NOTE — PHYSICAL EXAM
[de-identified] : Left shoulder:\par Shoulder: Range of Motion in Degrees:   	\par    	                        Claimant:  	Normal:  	\par Abduction (Active)  	180  	180 degrees  	\par Abduction (Passive)  	180  	180 degrees  	\par Forward elevation (Active):  	180  	180 degrees  	\par Forward elevation (Passive):  180  	180 degrees  	\par External rotation (Active):  	45  	45 degrees  	\par External rotation (Passive):  	45  	45 degrees  	\par Internal rotation (Active):  	L-1  	L-1  	\par Internal rotation (Passive):  	L-1  	L-1  \par 	\par No motor weakness to internal rotation, external rotation or abduction in the scapular plane.  Negative crank test.  Negative O’Les’s test.  Negative Speed’s test. Negative Yergason’s test.  Negative cross arm test.  No tenderness to palpation at the AC joint. Negative Hawkin’s sign.  Negative Neer’s sign.  Negative apprehension. Negative sulcus sign.  No gross neurological or vascular deficits distally.  Skin is intact.  No rashes, scars or lesions. 2+ radial and ulnar pulses. No extra-articular swelling or tenderness.\par  \par Right shoulder:\par Shoulder: Range of Motion in Degrees:    	\par 	                                  Claimant:	Normal:	\par Abduction (Active)	                  180	               180 degrees	\par Abduction (Passive)	  180	               180 degrees	\par Forward elevation (Active):	  180	               180 degrees	\par Forward elevation (Passive):	  180	               180 degrees	\par External rotation (Active):	   45	               45 degrees	\par External rotation (Passive):	   45	               45 degrees	\par Internal rotation (Active):	   L-1	               L-1	\par Internal rotation (Passive):	   L-1	               L-1	\par \par No motor weakness to internal rotation, external rotation or abduction in the scapular plane.  Negative crank test.  Negative O’Les’s test.  Negative Speed’s test. Negative Yergason’s test.  Positive cross arm test.  Positive tenderness to palpation over the AC joint. Negative Hawkin’s sign.  Negative Neer’s sign.  Negative apprehension. Negative sulcus sign.  No gross neurological or vascular deficits distally.  Skin is intact.  No rashes, scars or lesions. 2+ radial and ulnar pulses. No extra-articular swelling or tenderness.\par \par   [de-identified] : Gait: Normal    Station: Normal  [de-identified] : Appearance: Well-developed, well-nourished male  in no acute distress.  [de-identified] : Radiographs taken in the office today, two views of the right shoulder, show degenerative changes of the AC joint.

## 2022-05-19 NOTE — HISTORY OF PRESENT ILLNESS
[de-identified] : Jay comes in today for his right shoulder.  He has a many year history of having fallen down stairs injuring his shoulder.  He is having some persistent complaints of pain.

## 2022-06-15 ENCOUNTER — APPOINTMENT (OUTPATIENT)
Dept: ORTHOPEDIC SURGERY | Facility: CLINIC | Age: 32
End: 2022-06-15
Payer: COMMERCIAL

## 2022-06-15 DIAGNOSIS — M19.011 PRIMARY OSTEOARTHRITIS, RIGHT SHOULDER: ICD-10-CM

## 2022-06-15 PROCEDURE — 20610 DRAIN/INJ JOINT/BURSA W/O US: CPT | Mod: LT

## 2022-06-16 NOTE — PROCEDURE
[de-identified] : Indications:  \par AC joint arthritis right shoulder.\par \par Consent: \par At this time, I have recommended an injection to the right shoulder.  The risks and benefits of the procedure were discussed with the patient in detail.  Upon verbal consent of the patient, we proceeded with the injection as noted below.  \par \par Description of Procedure:  \par After a sterile prep, the patient underwent an injection of 9 cc of 1% Lidocaine without epinephrine and 1 cc of Kenalog (40 mg/mL) into the right shoulder.  The patient tolerated the procedure well.  There were no complications.  \par \par :  [employer]par Drug Name:  Triamcinolone Acetonide Injectable Suspension USP\par NCD#:  [id number]\par Lot#:  [id number]\par Expiration Date:  09/23\par

## 2022-06-16 NOTE — DISCUSSION/SUMMARY
[de-identified] : At this time, due to AC joint arthritis of the right shoulder, the patient was given a cortisone injection.  I recommend ice, elevation and reassessment in three weeks.

## 2022-06-16 NOTE — PHYSICAL EXAM
[Normal] : Gait: normal [de-identified] : Right Shoulder: \par Shoulder: Range of Motion in Degrees:    	\par 	                                  Claimant:	Normal:	\par Abduction (Active)	                  180	               180 degrees	\par Abduction (Passive)	  180	               180 degrees	\par Forward elevation (Active):	  180	               180 degrees	\par Forward elevation (Passive):	  180	               180 degrees	\par External rotation (Active):	   45	               45 degrees	\par External rotation (Passive):	   45	               45 degrees	\par Internal rotation (Active):	   L-1	               L-1	\par Internal rotation (Passive):	   L-1	               L-1	\par \par No motor weakness to internal rotation, external rotation or abduction in the scapular plane.  Negative crank test.  Negative O’Les’s test.  Negative Speed’s test. Negative Yergason’s test.  Positive cross arm test.  Positive tenderness to palpation over the AC joint. Negative Hawkin’s sign.  Negative Neer’s sign.  Negative apprehension. Negative sulcus sign.  No gross neurological or vascular deficits distally.  Skin is intact.  No rashes, scars or lesions. 2+ radial and ulnar pulses. No extra-articular swelling or tenderness.\par   [de-identified] : Station:  Normal. [de-identified] : Appearance:  Well-developed, well-nourished male in no acute distress.\par

## 2022-06-16 NOTE — ADDENDUM
[FreeTextEntry1] : This note was written by Awa Johnson on 06/16/2022 acting as scribe for Cral Richardson III, MD

## 2022-06-29 ENCOUNTER — APPOINTMENT (OUTPATIENT)
Dept: HUMAN REPRODUCTION | Facility: CLINIC | Age: 32
End: 2022-06-29

## 2022-06-29 ENCOUNTER — APPOINTMENT (OUTPATIENT)
Dept: ORTHOPEDIC SURGERY | Facility: CLINIC | Age: 32
End: 2022-06-29

## 2022-07-29 ENCOUNTER — APPOINTMENT (OUTPATIENT)
Dept: HUMAN REPRODUCTION | Facility: CLINIC | Age: 32
End: 2022-07-29

## 2022-08-10 ENCOUNTER — APPOINTMENT (OUTPATIENT)
Dept: HUMAN REPRODUCTION | Facility: CLINIC | Age: 32
End: 2022-08-10

## 2022-09-04 ENCOUNTER — NON-APPOINTMENT (OUTPATIENT)
Age: 32
End: 2022-09-04

## 2022-09-04 NOTE — ED STATDOCS - MEDICAL DECISION MAKING DETAILS
27 yr old M presented to ED with left wrist cat bite. Pt explained that he was  his dog and his brother cat from fighting when the cat bit him on his left wrist. Pt says that incident occurred yesterday and he noticed swelling and redness to his wrist. F/U with Dr. Mae
No

## 2022-09-13 ENCOUNTER — APPOINTMENT (OUTPATIENT)
Dept: ORTHOPEDIC SURGERY | Facility: CLINIC | Age: 32
End: 2022-09-13

## 2023-06-05 ENCOUNTER — APPOINTMENT (OUTPATIENT)
Dept: PSYCHIATRY | Facility: CLINIC | Age: 33
End: 2023-06-05
Payer: COMMERCIAL

## 2023-06-05 VITALS
OXYGEN SATURATION: 100 % | TEMPERATURE: 97.6 F | HEIGHT: 69 IN | DIASTOLIC BLOOD PRESSURE: 77 MMHG | RESPIRATION RATE: 18 BRPM | SYSTOLIC BLOOD PRESSURE: 148 MMHG | WEIGHT: 186 LBS | HEART RATE: 85 BPM | BODY MASS INDEX: 27.55 KG/M2

## 2023-06-05 PROCEDURE — 99204 OFFICE O/P NEW MOD 45 MIN: CPT

## 2023-06-05 RX ORDER — SULFAMETHOXAZOLE AND TRIMETHOPRIM 800; 160 MG/1; MG/1
800-160 TABLET ORAL TWICE DAILY
Qty: 10 | Refills: 3 | Status: DISCONTINUED | COMMUNITY
Start: 2020-08-12 | End: 2023-06-05

## 2023-06-05 NOTE — HISTORY OF PRESENT ILLNESS
[FreeTextEntry1] : Patient noticed emotional changes since his father . He states that in his childhood, he had some attention issues, but it never was an issue. At work recently, he cried and was upset when his boss approached him and asked if he was okay. His wife helped him to find a doctor to speak to in person for anxiety and depressive symptoms. \par Always worried for no reason. I have never been like this. I always assume the worst case scenario, and lately I am more emotional about things. No specific triggers.  for 5 year, and everything is okay at home. 2 family home with brother--good relationship.\par Work--[employer] for 12 years --2nd family good relationships works FT\par sleep--5-6 hrs of sleep \par Weight is about normal. Appetite has not changed \par energy level--average; not much physical activity \par  [FreeTextEntry2] : ADHD in childhood.\par No hospitalizations [FreeTextEntry3] : Ritalin as a child for ADHD, diagnosed at about 6 yo. On Ritalin until  graduation. Reports that medication helped, but it made him feel like "a zombie" and emotionally numb. Lexapro (unsure of why and states his mom made him). Lexapro gave him side effects with sexual dysfunctionin.

## 2023-06-05 NOTE — DISCUSSION/SUMMARY
[Low acute suicide risk] : Low acute suicide risk [Yes] : Yes [Not clinically indicated] : Safety Plan completed/updated (for individuals at risk): Not clinically indicated [FreeTextEntry1] : Patient presents with anxious and depressive symptoms, which have been worsening since his dad's death 6 years ago. Patient has been using cannabis to cope, upon wakening and at bedtime. He has a history of ADHD in the past and use of Lexapro and Ritalin. He states that Lexapro gave him sexual dysfunction.\par Patient works full time, but often feels anxious at work and outside of work, which often makes him irritable or very emotional. Patient has adequate support at home from his family. Pt denies suicidal or homicidal ideation at this time. \par Ordered Sertraline once daily for symptoms, and follow up in about 3 weeks.

## 2023-06-05 NOTE — PHYSICAL EXAM
[None] : none [Cooperative] : cooperative [Depressed] : depressed [Anxious] : anxious [Full] : full [Clear] : clear [Linear/Goal Directed] : linear/goal directed [Preoccupations/Ruminations] : preoccupations/ruminations [Depressive] : depressive [Self-Deprecatory] : self-deprecatory [Average] : average [WNL] : within normal limits

## 2023-06-05 NOTE — SOCIAL HISTORY
[FreeTextEntry1] : Social drinking, but not as much since his daughter was born. Stopped drinking last year, now it is less than monthly

## 2023-06-05 NOTE — RISK ASSESSMENT
[Clinical Interview] : Clinical Interview [No] : No [Mood disorder] : mood disorder [Depressed mood/Anhedonia] : depressed mood/anhedonia [Severe anxiety, agitation or panic] : severe anxiety, agitation or panic [Triggering events leading to humiliation, shame, and/or despair] : triggering events leading to humiliation, shame, and/or despair (e.g. loss of relationship, financial or health status) (real or anticipated) [Identifies reasons for living] : identifies reasons for living [Supportive social network of family or friends] : supportive social network of family or friends [Positive therapeutic relationships] : positive therapeutic relationships [Responsibility to children, family, or others] : responsibility to children, family, or others [Engaged in work or school] : engaged in work or school [None in the patient's lifetime] : None in the patient's lifetime [None Known] : none known [No known risk factors] : No known risk factors [FreeTextEntry3] : father passed about 6 years ago led to feelings of despair

## 2023-06-05 NOTE — PSYCHOSOCIAL ASSESSMENT
[All substances negative except as specified below] : All substances negative except as specified below [_____] : Last Use: [unfilled]  [HS Diploma or GED] : HS Diploma or GED [Competitive and integrated employment] : Competitive and integrated employment [35 hours or more] : 35 hours or more [Earned income] : earned income [Financially stable] : financially stable [None] : none [Client's spouse or domestic partner] : client's spouse or domestic partner [Yes] : yes [N/A] : n/a [No] : No [FreeTextEntry1] : Started smoking marijuana in high school and describes it as "self-medicating," and continues to have it twice a day to help with his anxiety that he wakes up with and prevents him from sleeping.  [FreeTextEntry2] :  for 5 years. Lives in 2 family home with wife and daughter and brother. Good relationships at home; states no stressors at home.  [FreeTextEntry3] : friends  [FreeTextEntry4] : s

## 2023-06-21 NOTE — ED ADULT NURSE NOTE - ATTEMPT TO OOB
no
Wound Closure with Sutures in Children    Your child was seen in the Emergency Department with a cut that required closure with stitches (sutures).  These will hold your child’s skin together while it heals.  They also make it less likely that your child will have a scar.    Sutures can be made from natural or synthetic materials. They can be made from a material that your body can break down as your wound heals (absorbable), or they can be made from a material that needs to be removed from your skin (nonabsorbable).  Sutures are strong and can be used for all kinds of wounds. Absorbable sutures may be used to close tissues deep under the skin. Nonabsorbable sutures need to be removed.    6 stitches were placed.      General tips for taking care of a child who has stitches placed:  Your sutures are ABSORBABLE, they should come out on their own.  But, if they are still there in 10 days, they should be removed.    HOW TO CARE FOR A WOUND  -Take medicines only as told by your doctor.  -If you were prescribed an antibiotic medicine for your wound, finish it all even if you start to feel better.  -It is generally considered better to have a wound gooey and covered (use an antibiotic ointment and cover with gauze or a Band-Aid).  -Wash your hands with soap and water before and after touching your wound.  -Do not soak your wound in water. Do not take baths, swim, or use a hot tub until your doctor says it is okay.  -After 24 hours you can shower.  -Do not take out your own sutures or staples.  -Do not pick at your wound. Picking can cause an infection.  -Keep all follow-up visits as told by your doctor. This is important.    If you notice signs of infection (worsening pain, swelling, surrounding erythema, fevers, pus draining), seek medical attention.      It takes skin about 6 months to fully heal.  To help prevent a prominent scar, be extra cautious about sun exposure; use sunscreen to prevent sunburn or suntan.    Follow up with your pediatrician in 1-2 days to make sure that your child is doing better.    Return to the Emergency Department if your child has:  -Fever or chills.  -Redness, puffiness (swelling), or pain at the site of the wound.  -There is fluid, blood, or pus coming from the wound.  -There is a bad smell coming from the wound.

## 2023-07-03 ENCOUNTER — APPOINTMENT (OUTPATIENT)
Dept: PSYCHIATRY | Facility: CLINIC | Age: 33
End: 2023-07-03
Payer: COMMERCIAL

## 2023-07-03 VITALS
SYSTOLIC BLOOD PRESSURE: 117 MMHG | RESPIRATION RATE: 18 BRPM | OXYGEN SATURATION: 98 % | WEIGHT: 176 LBS | HEART RATE: 78 BPM | HEIGHT: 69 IN | TEMPERATURE: 98.2 F | DIASTOLIC BLOOD PRESSURE: 77 MMHG | BODY MASS INDEX: 26.07 KG/M2

## 2023-07-03 PROCEDURE — 99214 OFFICE O/P EST MOD 30 MIN: CPT

## 2023-07-03 NOTE — HISTORY OF PRESENT ILLNESS
[FreeTextEntry1] : Patient reports improvement in intensity of emotion, where he is waking up with more ease. His anxiety and overwhelm are more manageable, where he is able to self soothe. No reported side effects. He has been fluctuating weight, but he is back to his normal weight. He still struggles falling asleep, and only gets 3-4 hours of sleep due to difficulty relaxing. No changes to physical health. Patient continues to use marijuana daily, but less throughout the day (only at night, instead of morning and night).

## 2023-07-03 NOTE — PHYSICAL EXAM
[None] : none [Cooperative] : cooperative [Anxious] : anxious [Full] : full [Clear] : clear [Linear/Goal Directed] : linear/goal directed [Average] : average [WNL] : within normal limits

## 2023-07-03 NOTE — DISCUSSION/SUMMARY
[FreeTextEntry1] : Medications are effective at this time. Patient reports improved symptoms in his anxiety and increased ability to self soothe. Discussed sleep hygiene since pt continues to report insomnia due to racing thoughts and inability to relax. Patient advised to take medication at 8 PM to help with insomnia. Patient advised to cut down on marijuana use, and also educated about medication interaction with alcohol and medication side effect of photosensitivity.

## 2023-07-03 NOTE — PLAN
[No] : No [Medication education provided] : Medication education provided. [Rationale for medication choices, possible risks/precautions, benefits, alternative treatment choices, and consequences of non-treatment discussed] : Rationale for medication choices, possible risks/precautions, benefits, alternative treatment choices, and consequences of non-treatment discussed with patient/family/caregiver  [Order(s) for medication placed in [facility] EHR] : Medication [FreeTextEntry5] : Medication has positive efficacy at this time. Continue current dose of Sertraline 50 mg daily to allow medication to take full effect. Follow up in one month.

## 2023-08-07 ENCOUNTER — RX RENEWAL (OUTPATIENT)
Age: 33
End: 2023-08-07

## 2023-08-10 ENCOUNTER — APPOINTMENT (OUTPATIENT)
Dept: PSYCHIATRY | Facility: CLINIC | Age: 33
End: 2023-08-10

## 2023-08-24 ENCOUNTER — APPOINTMENT (OUTPATIENT)
Dept: PSYCHIATRY | Facility: CLINIC | Age: 33
End: 2023-08-24
Payer: COMMERCIAL

## 2023-08-24 VITALS
SYSTOLIC BLOOD PRESSURE: 122 MMHG | TEMPERATURE: 96.6 F | HEART RATE: 98 BPM | DIASTOLIC BLOOD PRESSURE: 80 MMHG | RESPIRATION RATE: 16 BRPM | BODY MASS INDEX: 25.92 KG/M2 | HEIGHT: 69 IN | OXYGEN SATURATION: 98 % | WEIGHT: 175 LBS

## 2023-08-24 DIAGNOSIS — L72.3 SEBACEOUS CYST: ICD-10-CM

## 2023-08-24 PROCEDURE — 99214 OFFICE O/P EST MOD 30 MIN: CPT

## 2023-08-24 NOTE — DISCUSSION/SUMMARY
[FreeTextEntry1] : discussed recommendation for annual physical plan for increase in dose to 75 mg po daily Type Of Destruction Used: Curettage

## 2023-08-24 NOTE — REASON FOR VISIT
[Patient with collateral] : Patient with collateral  [Spouse] : spouse [FreeTextEntry1] : medication management

## 2023-08-24 NOTE — HISTORY OF PRESENT ILLNESS
[FreeTextEntry1] : "I think better"  more social with family Sister noted change Sleep hygiene is better PHQ-9= 11 c/o sexual dysfunction wife is pregnant so sex not priority reduced cannabis use to 2 x per week down from daily spouse feels he is still irritable

## 2023-10-09 ENCOUNTER — APPOINTMENT (OUTPATIENT)
Dept: PSYCHIATRY | Facility: CLINIC | Age: 33
End: 2023-10-09
Payer: COMMERCIAL

## 2023-10-09 VITALS
HEART RATE: 82 BPM | OXYGEN SATURATION: 100 % | WEIGHT: 182 LBS | SYSTOLIC BLOOD PRESSURE: 116 MMHG | HEIGHT: 69 IN | BODY MASS INDEX: 26.96 KG/M2 | DIASTOLIC BLOOD PRESSURE: 72 MMHG

## 2023-10-09 DIAGNOSIS — F12.10 CANNABIS ABUSE, UNCOMPLICATED: ICD-10-CM

## 2023-10-09 DIAGNOSIS — R37 SEXUAL DYSFUNCTION, UNSPECIFIED: ICD-10-CM

## 2023-10-09 DIAGNOSIS — F41.8 OTHER SPECIFIED ANXIETY DISORDERS: ICD-10-CM

## 2023-10-09 PROCEDURE — 99213 OFFICE O/P EST LOW 20 MIN: CPT

## 2023-10-16 ENCOUNTER — RX RENEWAL (OUTPATIENT)
Age: 33
End: 2023-10-16

## 2023-12-12 NOTE — ED STATDOCS - GENITOURINARY, MLM
Spoke with patient about the information Soraida Calixto said, patient is scheduled for 12/21 for a evaluation appointment. Patient verbalized understanding. normal... Deferred right groin exam until he is in a room

## 2024-01-30 ENCOUNTER — RX RENEWAL (OUTPATIENT)
Age: 34
End: 2024-01-30

## 2024-01-30 RX ORDER — SERTRALINE HYDROCHLORIDE 50 MG/1
50 TABLET, FILM COATED ORAL DAILY
Qty: 135 | Refills: 1 | Status: ACTIVE | COMMUNITY
Start: 2023-06-05 | End: 1900-01-01

## 2024-04-09 NOTE — ASSESSMENT
Echo=edema  BNP  BMP 1 week  Watch salt intake  Elevate legs  Start hydrochlorothiazide once daily for swelling   [FreeTextEntry1] : Scrotal sebaceous cyst:\par Discussed risks and benefits of each. Discussed the procedure and the post operative course. \par Will get Scrotal Ultrasound. \par \par Will schedule next available Excision of Scrotal sebaceous cyst.

## 2024-09-17 ENCOUNTER — APPOINTMENT (OUTPATIENT)
Dept: FAMILY MEDICINE | Facility: CLINIC | Age: 34
End: 2024-09-17

## 2024-10-01 ENCOUNTER — APPOINTMENT (OUTPATIENT)
Dept: FAMILY MEDICINE | Facility: CLINIC | Age: 34
End: 2024-10-01

## 2025-02-04 ENCOUNTER — NON-APPOINTMENT (OUTPATIENT)
Age: 35
End: 2025-02-04

## 2025-03-21 NOTE — ED ADULT NURSE NOTE - PAIN RATING/NUMBER SCALE (0-10): ACTIVITY
Called pt.  Relayed MD's message and plan from last visit.  No questions.  Advised to keep her f/u appt with Dr Schoenleber in one year.      Plan:  - KUB in 1 year  - continue vaginal estrogen cream - refilled 1 year  - Stone prevention  - Appt with Dr. Schoenleber, her usual urologist   0

## 2025-09-16 ENCOUNTER — APPOINTMENT (OUTPATIENT)
Dept: PSYCHIATRY | Facility: CLINIC | Age: 35
End: 2025-09-16
Payer: COMMERCIAL

## 2025-09-16 DIAGNOSIS — F41.8 OTHER SPECIFIED ANXIETY DISORDERS: ICD-10-CM

## 2025-09-16 PROCEDURE — 90833 PSYTX W PT W E/M 30 MIN: CPT

## 2025-09-16 PROCEDURE — 99214 OFFICE O/P EST MOD 30 MIN: CPT

## 2025-09-16 RX ORDER — BUPROPION HYDROCHLORIDE 75 MG/1
75 TABLET, FILM COATED ORAL
Qty: 30 | Refills: 1 | Status: ACTIVE | COMMUNITY
Start: 2025-09-16 | End: 1900-01-01